# Patient Record
Sex: MALE | Race: WHITE | Employment: FULL TIME | URBAN - METROPOLITAN AREA
[De-identification: names, ages, dates, MRNs, and addresses within clinical notes are randomized per-mention and may not be internally consistent; named-entity substitution may affect disease eponyms.]

---

## 2023-09-25 ENCOUNTER — TELEPHONE (OUTPATIENT)
Age: 48
End: 2023-09-25

## 2023-09-25 NOTE — TELEPHONE ENCOUNTER
Patient's spouse called the office today requesting an appointment as soon as possible. Patient previously seen at [facility] by Amanda CURIEL on 2/2/23 for intracranial stenosis. Spouse states patient has been having increased migraines since last seen and confusion with speaking words. Patient has not gone to ED for these symptoms, spouse states she has tried to get him to go but patient declines. When asked if patient has seen PCP for these symptoms, spouse states their PCP lost license for 6 months and can't be seen. Spoke to [employer] APRN-CNP. She stated to inform patient that we can see him in the office this week and that if any imaging needs to be ordered, it may not be covered outpatient. Patient insisted on still being seen in the office. Appointment has been scheduled on Wednesday, 9/27/23 @ 115 pm with Xenia. Directions to our office were given in detail. Nothing further needed. All imaging from [facility] AND [facility] has been requested and in PACS for review.

## 2023-09-26 RX ORDER — METFORMIN HYDROCHLORIDE 500 MG/1
TABLET, EXTENDED RELEASE ORAL
COMMUNITY
Start: 2023-09-25

## 2023-09-26 RX ORDER — TADALAFIL 20 MG/1
TABLET ORAL
COMMUNITY
Start: 2023-08-31 | End: 2023-09-27

## 2023-09-26 RX ORDER — CLOPIDOGREL BISULFATE 75 MG/1
75 TABLET ORAL DAILY
COMMUNITY
Start: 2023-02-02

## 2023-09-26 RX ORDER — OMEPRAZOLE 20 MG/1
20 CAPSULE, DELAYED RELEASE ORAL 2 TIMES DAILY
COMMUNITY
Start: 2023-07-31

## 2023-09-26 RX ORDER — ATORVASTATIN CALCIUM 80 MG/1
1 TABLET, FILM COATED ORAL DAILY
COMMUNITY
Start: 2023-06-24

## 2023-09-26 RX ORDER — BLOOD-GLUCOSE SENSOR
EACH MISCELLANEOUS
COMMUNITY
Start: 2023-08-31

## 2023-09-26 RX ORDER — HYDROCODONE BITARTRATE AND ACETAMINOPHEN 5; 325 MG/1; MG/1
1 TABLET ORAL EVERY 6 HOURS PRN
COMMUNITY
End: 2023-09-27 | Stop reason: ALTCHOICE

## 2023-09-26 NOTE — PROGRESS NOTES
Outpatient Vascular Neurology Service Consult Note     Patient Name: Theresa Rodriguez  : 1975        Subjective:   Reason for consult:   Theresa Rodriguez is a 57 y.o. male past medical history HTN, HLD, GERD, DM, COVID-19 infection, TIA presenting for me and stenting of the left intracranial vertebral artery short on 2022. The patient was admitted to [facility] on 3/4/2022 with complaints of diplopia and right-sided paresthesias patient was found to have an acute left occipital ischemic stroke. Head and neck was notable for plaque narrowing V4 segment of the right vertebral artery and hypoplastic left vertebral artery. MRI of the brain demonstrated suspected old left occipital infarct that time the patient underwent cerebral angiogram Short which noted high-grade irregular stenosis in the V4 segment of the right vertebral artery at the origin of the posterior inferior cerebellar artery. 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 The patient then underwent full embolectomy and stenting of intracranial left vertebral artery with significantly improved flow after stent placement. No significant residual stenosis on 22. The patient was continued on DAPT with aspirin and Plavix     Repeat angiogram 2022 showed patent stent identified in the intracranial right vertebral artery. Verify now's aspirin and P2Y12 are therapeutic. Aspirin was discontinued. He continued on Plavix as well as statin. Patient is accompanied by his wife during the visit. Patient reports approximately 2 weeks ago he complained of a migraine with associated speech changes lasting 2 days. He did not go to the ED. His wife reports that his walking has been worse over the 6 months. He is stumbling more. She is concerned for another stroke. He is prescribed Plavix however states that upsets his stomach and he has been rotating aspirin 81 mg and Plavix.   Discussed with the patient okay to take aspirin 81 mg

## 2023-09-27 ENCOUNTER — INITIAL CONSULT (OUTPATIENT)
Age: 48
End: 2023-09-27

## 2023-09-27 VITALS
DIASTOLIC BLOOD PRESSURE: 84 MMHG | OXYGEN SATURATION: 98 % | HEIGHT: 70 IN | WEIGHT: 179.25 LBS | RESPIRATION RATE: 16 BRPM | SYSTOLIC BLOOD PRESSURE: 118 MMHG | BODY MASS INDEX: 25.66 KG/M2 | HEART RATE: 76 BPM

## 2023-09-27 DIAGNOSIS — I63.9 ARTERIAL ISCHEMIC STROKE (HCC): ICD-10-CM

## 2023-09-27 DIAGNOSIS — I65.03 OCCLUSION AND STENOSIS OF BILATERAL VERTEBRAL ARTERIES: Primary | ICD-10-CM

## 2023-09-27 DIAGNOSIS — R29.90 STROKE-LIKE SYMPTOMS: ICD-10-CM

## 2023-09-27 PROBLEM — E11.00 UNCONTROLLED TYPE 2 DM WITH HYPEROSMOLAR NONKETOTIC HYPERGLYCEMIA (HCC): Status: ACTIVE | Noted: 2022-03-04

## 2023-09-27 PROBLEM — Z86.16 HISTORY OF COVID-19: Status: ACTIVE | Noted: 2022-04-04

## 2023-09-27 PROBLEM — I10 PRIMARY HYPERTENSION: Status: ACTIVE | Noted: 2017-05-01

## 2023-09-27 PROCEDURE — 3074F SYST BP LT 130 MM HG: CPT | Performed by: NURSE PRACTITIONER

## 2023-09-27 PROCEDURE — 3079F DIAST BP 80-89 MM HG: CPT | Performed by: NURSE PRACTITIONER

## 2023-09-27 PROCEDURE — 99204 OFFICE O/P NEW MOD 45 MIN: CPT | Performed by: NURSE PRACTITIONER

## 2023-09-27 RX ORDER — ACETAMINOPHEN 500 MG
500 TABLET ORAL EVERY 4 HOURS
COMMUNITY
Start: 2022-01-20

## 2023-11-03 ENCOUNTER — TELEPHONE (OUTPATIENT)
Age: 48
End: 2023-11-03

## 2023-11-03 DIAGNOSIS — I65.03 OCCLUSION AND STENOSIS OF BILATERAL VERTEBRAL ARTERIES: Primary | ICD-10-CM

## 2023-11-03 DIAGNOSIS — I63.9 ARTERIAL ISCHEMIC STROKE (HCC): ICD-10-CM

## 2023-11-03 DIAGNOSIS — I65.03 OCCLUSION AND STENOSIS OF BILATERAL VERTEBRAL ARTERIES: ICD-10-CM

## 2023-11-03 DIAGNOSIS — R29.90 STROKE-LIKE SYMPTOMS: ICD-10-CM

## 2023-11-03 NOTE — TELEPHONE ENCOUNTER
----- Message from Marilynn Dakin, APRN - CNP sent at 11/3/2023  1:53 PM EDT -----  Hello,    Can you please send his CTA head and neck results to his family doctor. Recommend follow-up with his family doctor for nodular densities in both upper lobes noted on CTA.      Thank you, Kyra Forman

## 2023-11-03 NOTE — TELEPHONE ENCOUNTER
Called results of MRI, CTA head and neck. CTA head and neck in 6 months to further evaluate left intracranial vertebral artery stent. CTA results forwarded to PCP. Recommend follow-up PCP for nodular densities and both upper lobes noted on CTA. Questions answered. Patient verbalizes understanding and is agreeable. S&S of stroke [facility].E  [facility].A. S. T reviewed with the patient and to dial 911 with any symptoms.

## 2024-04-12 ENCOUNTER — TELEPHONE (OUTPATIENT)
Age: 49
End: 2024-04-12

## 2024-04-12 NOTE — TELEPHONE ENCOUNTER
Left message for patient to return call to the office. Patient is due for a CTA head/neck on or around 5/1/24. Need to see what facility patient would like to have done at since his previous one was through [facility].

## 2024-04-19 NOTE — TELEPHONE ENCOUNTER
Letter sent to patient via mail - unable to reach patient after multiple attempts and no response. Need to find out where patient wants CTA obtained through - either [facility] or [facility]. Last imaging done through [facility] ([facility]).